# Patient Record
Sex: FEMALE | Race: BLACK OR AFRICAN AMERICAN | NOT HISPANIC OR LATINO
[De-identification: names, ages, dates, MRNs, and addresses within clinical notes are randomized per-mention and may not be internally consistent; named-entity substitution may affect disease eponyms.]

---

## 2022-04-19 ENCOUNTER — APPOINTMENT (OUTPATIENT)
Dept: ANTEPARTUM | Facility: CLINIC | Age: 32
End: 2022-04-19

## 2022-04-19 PROBLEM — Z00.00 ENCOUNTER FOR PREVENTIVE HEALTH EXAMINATION: Status: ACTIVE | Noted: 2022-04-19

## 2022-05-20 ENCOUNTER — ASOB RESULT (OUTPATIENT)
Age: 32
End: 2022-05-20

## 2022-05-20 ENCOUNTER — APPOINTMENT (OUTPATIENT)
Dept: ANTEPARTUM | Facility: CLINIC | Age: 32
End: 2022-05-20
Payer: COMMERCIAL

## 2022-05-20 PROCEDURE — 76813 OB US NUCHAL MEAS 1 GEST: CPT

## 2022-05-20 PROCEDURE — 36416 COLLJ CAPILLARY BLOOD SPEC: CPT

## 2022-05-20 PROCEDURE — 76801 OB US < 14 WKS SINGLE FETUS: CPT

## 2022-05-21 ENCOUNTER — TRANSCRIPTION ENCOUNTER (OUTPATIENT)
Age: 32
End: 2022-05-21

## 2022-07-18 ENCOUNTER — ASOB RESULT (OUTPATIENT)
Age: 32
End: 2022-07-18

## 2022-07-18 ENCOUNTER — APPOINTMENT (OUTPATIENT)
Dept: ANTEPARTUM | Facility: CLINIC | Age: 32
End: 2022-07-18

## 2022-07-18 PROCEDURE — 76811 OB US DETAILED SNGL FETUS: CPT

## 2022-09-01 ENCOUNTER — APPOINTMENT (OUTPATIENT)
Dept: ANTEPARTUM | Facility: CLINIC | Age: 32
End: 2022-09-01

## 2022-09-01 ENCOUNTER — ASOB RESULT (OUTPATIENT)
Age: 32
End: 2022-09-01

## 2022-09-01 PROCEDURE — 76816 OB US FOLLOW-UP PER FETUS: CPT

## 2022-09-01 PROCEDURE — 76819 FETAL BIOPHYS PROFIL W/O NST: CPT

## 2022-09-30 ENCOUNTER — ASOB RESULT (OUTPATIENT)
Age: 32
End: 2022-09-30

## 2022-09-30 ENCOUNTER — APPOINTMENT (OUTPATIENT)
Dept: ANTEPARTUM | Facility: CLINIC | Age: 32
End: 2022-09-30

## 2022-09-30 PROCEDURE — 76816 OB US FOLLOW-UP PER FETUS: CPT | Mod: 59

## 2022-09-30 PROCEDURE — 76819 FETAL BIOPHYS PROFIL W/O NST: CPT

## 2022-10-28 ENCOUNTER — APPOINTMENT (OUTPATIENT)
Dept: ANTEPARTUM | Facility: CLINIC | Age: 32
End: 2022-10-28

## 2022-10-28 ENCOUNTER — ASOB RESULT (OUTPATIENT)
Age: 32
End: 2022-10-28

## 2022-10-28 PROCEDURE — 76818 FETAL BIOPHYS PROFILE W/NST: CPT | Mod: 59

## 2022-10-28 PROCEDURE — 76816 OB US FOLLOW-UP PER FETUS: CPT

## 2022-11-04 ENCOUNTER — APPOINTMENT (OUTPATIENT)
Dept: ANTEPARTUM | Facility: CLINIC | Age: 32
End: 2022-11-04

## 2022-11-04 ENCOUNTER — ASOB RESULT (OUTPATIENT)
Age: 32
End: 2022-11-04

## 2022-11-04 PROCEDURE — 76818 FETAL BIOPHYS PROFILE W/NST: CPT

## 2022-11-11 ENCOUNTER — ASOB RESULT (OUTPATIENT)
Age: 32
End: 2022-11-11

## 2022-11-11 ENCOUNTER — APPOINTMENT (OUTPATIENT)
Dept: ANTEPARTUM | Facility: CLINIC | Age: 32
End: 2022-11-11

## 2022-11-11 PROCEDURE — 76818 FETAL BIOPHYS PROFILE W/NST: CPT

## 2022-11-18 ENCOUNTER — ASOB RESULT (OUTPATIENT)
Age: 32
End: 2022-11-18

## 2022-11-18 ENCOUNTER — APPOINTMENT (OUTPATIENT)
Dept: ANTEPARTUM | Facility: CLINIC | Age: 32
End: 2022-11-18

## 2022-11-18 PROCEDURE — 76816 OB US FOLLOW-UP PER FETUS: CPT

## 2022-11-18 PROCEDURE — 76818 FETAL BIOPHYS PROFILE W/NST: CPT

## 2022-11-30 ENCOUNTER — TRANSCRIPTION ENCOUNTER (OUTPATIENT)
Age: 32
End: 2022-11-30

## 2022-11-30 ENCOUNTER — INPATIENT (INPATIENT)
Facility: HOSPITAL | Age: 32
LOS: 3 days | Discharge: HOME | End: 2022-12-04
Attending: OBSTETRICS & GYNECOLOGY | Admitting: OBSTETRICS & GYNECOLOGY
Payer: COMMERCIAL

## 2022-11-30 VITALS
HEIGHT: 65 IN | SYSTOLIC BLOOD PRESSURE: 115 MMHG | HEART RATE: 91 BPM | RESPIRATION RATE: 18 BRPM | WEIGHT: 293 LBS | DIASTOLIC BLOOD PRESSURE: 60 MMHG | OXYGEN SATURATION: 99 % | TEMPERATURE: 98 F

## 2022-11-30 DIAGNOSIS — Z34.90 ENCOUNTER FOR SUPERVISION OF NORMAL PREGNANCY, UNSPECIFIED, UNSPECIFIED TRIMESTER: ICD-10-CM

## 2022-11-30 DIAGNOSIS — O61.9 FAILED INDUCTION OF LABOR, UNSPECIFIED: ICD-10-CM

## 2022-11-30 LAB
BASOPHILS # BLD AUTO: 0.02 K/UL — SIGNIFICANT CHANGE UP (ref 0–0.2)
BASOPHILS NFR BLD AUTO: 0.3 % — SIGNIFICANT CHANGE UP (ref 0–2)
BLD GP AB SCN SERPL QL: NEGATIVE — SIGNIFICANT CHANGE UP
EOSINOPHIL # BLD AUTO: 0.01 K/UL — SIGNIFICANT CHANGE UP (ref 0–0.5)
EOSINOPHIL NFR BLD AUTO: 0.1 % — SIGNIFICANT CHANGE UP (ref 0–6)
HCT VFR BLD CALC: 38.3 % — SIGNIFICANT CHANGE UP (ref 34.5–45)
HGB BLD-MCNC: 12.5 G/DL — SIGNIFICANT CHANGE UP (ref 11.5–15.5)
IANC: 5.4 K/UL — SIGNIFICANT CHANGE UP (ref 1.8–7.4)
IMM GRANULOCYTES NFR BLD AUTO: 0.5 % — SIGNIFICANT CHANGE UP (ref 0–0.9)
LYMPHOCYTES # BLD AUTO: 1.6 K/UL — SIGNIFICANT CHANGE UP (ref 1–3.3)
LYMPHOCYTES # BLD AUTO: 21.5 % — SIGNIFICANT CHANGE UP (ref 13–44)
MCHC RBC-ENTMCNC: 26.8 PG — LOW (ref 27–34)
MCHC RBC-ENTMCNC: 32.6 GM/DL — SIGNIFICANT CHANGE UP (ref 32–36)
MCV RBC AUTO: 82 FL — SIGNIFICANT CHANGE UP (ref 80–100)
MONOCYTES # BLD AUTO: 0.37 K/UL — SIGNIFICANT CHANGE UP (ref 0–0.9)
MONOCYTES NFR BLD AUTO: 5 % — SIGNIFICANT CHANGE UP (ref 2–14)
NEUTROPHILS # BLD AUTO: 5.4 K/UL — SIGNIFICANT CHANGE UP (ref 1.8–7.4)
NEUTROPHILS NFR BLD AUTO: 72.6 % — SIGNIFICANT CHANGE UP (ref 43–77)
NRBC # BLD: 0 /100 WBCS — SIGNIFICANT CHANGE UP (ref 0–0)
NRBC # FLD: 0 K/UL — SIGNIFICANT CHANGE UP (ref 0–0)
PLATELET # BLD AUTO: 261 K/UL — SIGNIFICANT CHANGE UP (ref 150–400)
RBC # BLD: 4.67 M/UL — SIGNIFICANT CHANGE UP (ref 3.8–5.2)
RBC # FLD: 16.3 % — HIGH (ref 10.3–14.5)
RH IG SCN BLD-IMP: NEGATIVE — SIGNIFICANT CHANGE UP
RH IG SCN BLD-IMP: NEGATIVE — SIGNIFICANT CHANGE UP
SARS-COV-2 RNA SPEC QL NAA+PROBE: SIGNIFICANT CHANGE UP
WBC # BLD: 7.44 K/UL — SIGNIFICANT CHANGE UP (ref 3.8–10.5)
WBC # FLD AUTO: 7.44 K/UL — SIGNIFICANT CHANGE UP (ref 3.8–10.5)

## 2022-11-30 RX ORDER — SODIUM CHLORIDE 9 MG/ML
1000 INJECTION, SOLUTION INTRAVENOUS
Refills: 0 | Status: DISCONTINUED | OUTPATIENT
Start: 2022-11-30 | End: 2022-12-01

## 2022-11-30 RX ORDER — CHLORHEXIDINE GLUCONATE 213 G/1000ML
1 SOLUTION TOPICAL ONCE
Refills: 0 | Status: DISCONTINUED | OUTPATIENT
Start: 2022-11-30 | End: 2022-12-01

## 2022-11-30 RX ORDER — CITRIC ACID/SODIUM CITRATE 300-500 MG
15 SOLUTION, ORAL ORAL EVERY 6 HOURS
Refills: 0 | Status: DISCONTINUED | OUTPATIENT
Start: 2022-11-30 | End: 2022-12-01

## 2022-11-30 RX ORDER — OXYTOCIN 10 UNIT/ML
333.33 VIAL (ML) INJECTION
Qty: 20 | Refills: 0 | Status: DISCONTINUED | OUTPATIENT
Start: 2022-11-30 | End: 2022-12-04

## 2022-11-30 NOTE — OB PROVIDER H&P - NSHPPHYSICALEXAM_GEN_ALL_CORE
Physical Exam:  General: NAD, well appearing  Abdomen: gravid, NT, NR, NG  Extremities: No calf tenderness b/l    SVE: 0.5/50/-3    BSUS: Vertex, +FH    Vitals pending  NST pending

## 2022-11-30 NOTE — OB PROVIDER H&P - PROBLEM SELECTOR PLAN 1
-Admit to OB service  -Admission labs  -IVF  -EFM/[place]  -Monitor vitals  -For PO cytotec  -2U on hold for BMI  -Consent signed an placed in chart  -GBS neg  -Covid swab sent to lab

## 2022-11-30 NOTE — OB PROVIDER H&P - HISTORY OF PRESENT ILLNESS
33yo  @40w4d presenting for IOL for late term. Denies contractions, LOF, vaginal bleeding. Endorses good fetal movement. Reports that this is an otherwise uncomplicated pregnancy.    GBS neg  EFW 3600    OBHx: Primigravida  GYNHx: Denies   MHx: BMI 48.8  SHx: Denies  PsychHx: Denies  SocialHx: Denies  Allergies: Denies  Meds: PNV, ASA, Iron    Patient accepts blood products 33yo  @40w4d presenting for elective IOL. Denies contractions, LOF, vaginal bleeding. Endorses good fetal movement. Reports that this is an otherwise uncomplicated pregnancy.    GBS neg  EFW 3600    OBHx: Primigravida  GYNHx: Denies   MHx: BMI 48.8  SHx: Denies  PsychHx: Denies  SocialHx: Denies  Allergies: Denies  Meds: PNV, ASA, Iron    Patient accepts blood products 33yo  @40w4d presenting for indicated for morbid obesity IOL at term. Denies contractions, LOF, vaginal bleeding. Endorses good fetal movement. Reports that this is an otherwise uncomplicated pregnancy.    GBS neg  EFW 3600    OBHx: Primigravida  GYNHx: Denies   MHx: BMI 48.8  SHx: Denies  PsychHx: Denies  SocialHx: Denies  Allergies: Denies  Meds: PNV, ASA, Iron    Patient accepts blood products

## 2022-11-30 NOTE — OB RN PATIENT PROFILE - PRO PRENATAL LABS ORI SOURCE HIV
interpretation of diagnostic studies/additional history taking/documentation/consultation with other physicians hard copy, drawn during this pregnancy

## 2022-11-30 NOTE — OB RN PATIENT PROFILE - FALL HARM RISK - UNIVERSAL INTERVENTIONS
Bed in lowest position, wheels locked, appropriate side rails in place/Call bell, personal items and telephone in reach/Instruct patient to call for assistance before getting out of bed or chair/Non-slip footwear when patient is out of bed/[place] to call system/Physically safe environment - no spills, clutter or unnecessary equipment/Purposeful Proactive Rounding/Room/bathroom lighting operational, light cord in reach

## 2022-11-30 NOTE — OB PROVIDER H&P - NSLOWPPHRISK_OBGYN_A_OB
No previous uterine incision/Rubin Pregnancy/Less than or equal to 4 previous vaginal births/No known bleeding disorder

## 2022-11-30 NOTE — OB RN PATIENT PROFILE - AS SC BRADEN SENSORY
Prolia injection  given. Verbal order for injection from YUDELKA Flood  . Patient tolerated without incident. See MAR for documentation.      (4) no impairment

## 2022-11-30 NOTE — OB RN PATIENT PROFILE - PRO RUBELLA INFANT
Mohs Histo Method Verbiage: Each section was then chromacoded and processed in the Mohs lab using the Mohs protocol and submitted for frozen section. immune

## 2022-11-30 NOTE — OB PROVIDER H&P - ASSESSMENT
33yo  @40w4d IOL for late term. NST pending. Vitals pending.     Plan:  -Admit to OB service  -Admission labs  -IVF  -EFM/[place]  -Monitor vitals  -For PO cytotec  -2U on hold for BMI  -Consent signed an placed in chart  -GBS neg  -Covid swab sent to lab    Plan per Dr. Mckenzie CURTIS)    Lucila Dash PA-C  31yo  @40w4d elective IOL. NST pending. Vitals pending.     Plan:  -Admit to OB service  -Admission labs  -IVF  -EFM/[place]  -Monitor vitals  -For PO cytotec  -2U on hold for BMI  -Consent signed an placed in chart  -GBS neg  -Covid swab sent to lab    Plan per Dr. Mckenzie CURTIS)    Lucila Dash PA-C

## 2022-12-01 LAB
ALBUMIN SERPL ELPH-MCNC: 2.9 G/DL — LOW (ref 3.3–5)
ALP SERPL-CCNC: 137 U/L — HIGH (ref 40–120)
ALT FLD-CCNC: 34 U/L — HIGH (ref 4–33)
ANION GAP SERPL CALC-SCNC: 13 MMOL/L — SIGNIFICANT CHANGE UP (ref 7–14)
APTT BLD: 25.1 SEC — LOW (ref 27–36.3)
AST SERPL-CCNC: 29 U/L — SIGNIFICANT CHANGE UP (ref 4–32)
BASOPHILS # BLD AUTO: 0.02 K/UL — SIGNIFICANT CHANGE UP (ref 0–0.2)
BASOPHILS NFR BLD AUTO: 0.1 % — SIGNIFICANT CHANGE UP (ref 0–2)
BILIRUB SERPL-MCNC: 0.8 MG/DL — SIGNIFICANT CHANGE UP (ref 0.2–1.2)
BUN SERPL-MCNC: 4 MG/DL — LOW (ref 7–23)
CALCIUM SERPL-MCNC: 8.8 MG/DL — SIGNIFICANT CHANGE UP (ref 8.4–10.5)
CHLORIDE SERPL-SCNC: 103 MMOL/L — SIGNIFICANT CHANGE UP (ref 98–107)
CO2 SERPL-SCNC: 18 MMOL/L — LOW (ref 22–31)
COVID-19 SPIKE DOMAIN AB INTERP: POSITIVE
COVID-19 SPIKE DOMAIN ANTIBODY RESULT: >250 U/ML — HIGH
CREAT SERPL-MCNC: 0.56 MG/DL — SIGNIFICANT CHANGE UP (ref 0.5–1.3)
EGFR: 124 ML/MIN/1.73M2 — SIGNIFICANT CHANGE UP
EOSINOPHIL # BLD AUTO: 0 K/UL — SIGNIFICANT CHANGE UP (ref 0–0.5)
EOSINOPHIL NFR BLD AUTO: 0 % — SIGNIFICANT CHANGE UP (ref 0–6)
GLUCOSE SERPL-MCNC: 140 MG/DL — HIGH (ref 70–99)
HCT VFR BLD CALC: 41.4 % — SIGNIFICANT CHANGE UP (ref 34.5–45)
HGB BLD-MCNC: 13.1 G/DL — SIGNIFICANT CHANGE UP (ref 11.5–15.5)
IANC: 15.54 K/UL — HIGH (ref 1.8–7.4)
IMM GRANULOCYTES NFR BLD AUTO: 0.5 % — SIGNIFICANT CHANGE UP (ref 0–0.9)
INR BLD: 0.94 RATIO — SIGNIFICANT CHANGE UP (ref 0.88–1.16)
LDH SERPL L TO P-CCNC: 239 U/L — HIGH (ref 135–225)
LYMPHOCYTES # BLD AUTO: 0.9 K/UL — LOW (ref 1–3.3)
LYMPHOCYTES # BLD AUTO: 5.3 % — LOW (ref 13–44)
MCHC RBC-ENTMCNC: 26.7 PG — LOW (ref 27–34)
MCHC RBC-ENTMCNC: 31.6 GM/DL — LOW (ref 32–36)
MCV RBC AUTO: 84.3 FL — SIGNIFICANT CHANGE UP (ref 80–100)
MONOCYTES # BLD AUTO: 0.32 K/UL — SIGNIFICANT CHANGE UP (ref 0–0.9)
MONOCYTES NFR BLD AUTO: 1.9 % — LOW (ref 2–14)
NEUTROPHILS # BLD AUTO: 15.54 K/UL — HIGH (ref 1.8–7.4)
NEUTROPHILS NFR BLD AUTO: 92.2 % — HIGH (ref 43–77)
NRBC # BLD: 0 /100 WBCS — SIGNIFICANT CHANGE UP (ref 0–0)
NRBC # FLD: 0 K/UL — SIGNIFICANT CHANGE UP (ref 0–0)
PLATELET # BLD AUTO: 250 K/UL — SIGNIFICANT CHANGE UP (ref 150–400)
POTASSIUM SERPL-MCNC: 3.8 MMOL/L — SIGNIFICANT CHANGE UP (ref 3.5–5.3)
POTASSIUM SERPL-SCNC: 3.8 MMOL/L — SIGNIFICANT CHANGE UP (ref 3.5–5.3)
PROT SERPL-MCNC: 6.1 G/DL — SIGNIFICANT CHANGE UP (ref 6–8.3)
PROTHROM AB SERPL-ACNC: 10.9 SEC — SIGNIFICANT CHANGE UP (ref 10.5–13.4)
RBC # BLD: 4.91 M/UL — SIGNIFICANT CHANGE UP (ref 3.8–5.2)
RBC # FLD: 16.1 % — HIGH (ref 10.3–14.5)
SARS-COV-2 IGG+IGM SERPL QL IA: >250 U/ML — HIGH
SARS-COV-2 IGG+IGM SERPL QL IA: POSITIVE
SODIUM SERPL-SCNC: 134 MMOL/L — LOW (ref 135–145)
T PALLIDUM AB TITR SER: NEGATIVE — SIGNIFICANT CHANGE UP
URATE SERPL-MCNC: 5.3 MG/DL — SIGNIFICANT CHANGE UP (ref 2.5–7)
WBC # BLD: 16.87 K/UL — HIGH (ref 3.8–10.5)
WBC # FLD AUTO: 16.87 K/UL — HIGH (ref 3.8–10.5)

## 2022-12-01 DEVICE — SURGICEL FIBRILLAR 1 X 2": Type: IMPLANTABLE DEVICE | Status: FUNCTIONAL

## 2022-12-01 RX ORDER — NALOXONE HYDROCHLORIDE 4 MG/.1ML
0.1 SPRAY NASAL
Refills: 0 | Status: DISCONTINUED | OUTPATIENT
Start: 2022-12-01 | End: 2022-12-04

## 2022-12-01 RX ORDER — ERTAPENEM SODIUM 1 G/1
INJECTION, POWDER, LYOPHILIZED, FOR SOLUTION INTRAMUSCULAR; INTRAVENOUS
Refills: 0 | Status: DISCONTINUED | OUTPATIENT
Start: 2022-12-01 | End: 2022-12-02

## 2022-12-01 RX ORDER — IBUPROFEN 200 MG
600 TABLET ORAL EVERY 6 HOURS
Refills: 0 | Status: COMPLETED | OUTPATIENT
Start: 2022-12-01 | End: 2023-10-30

## 2022-12-01 RX ORDER — LANOLIN
1 OINTMENT (GRAM) TOPICAL EVERY 6 HOURS
Refills: 0 | Status: DISCONTINUED | OUTPATIENT
Start: 2022-12-01 | End: 2022-12-04

## 2022-12-01 RX ORDER — OXYCODONE HYDROCHLORIDE 5 MG/1
5 TABLET ORAL
Refills: 0 | Status: DISCONTINUED | OUTPATIENT
Start: 2022-12-01 | End: 2022-12-01

## 2022-12-01 RX ORDER — MORPHINE SULFATE 50 MG/1
2 CAPSULE, EXTENDED RELEASE ORAL ONCE
Refills: 0 | Status: DISCONTINUED | OUTPATIENT
Start: 2022-12-01 | End: 2022-12-04

## 2022-12-01 RX ORDER — DEXAMETHASONE 0.5 MG/5ML
4 ELIXIR ORAL EVERY 6 HOURS
Refills: 0 | Status: DISCONTINUED | OUTPATIENT
Start: 2022-12-01 | End: 2022-12-04

## 2022-12-01 RX ORDER — FERROUS SULFATE 325(65) MG
325 TABLET ORAL DAILY
Refills: 0 | Status: DISCONTINUED | OUTPATIENT
Start: 2022-12-01 | End: 2022-12-04

## 2022-12-01 RX ORDER — ERTAPENEM SODIUM 1 G/1
1000 INJECTION, POWDER, LYOPHILIZED, FOR SOLUTION INTRAMUSCULAR; INTRAVENOUS ONCE
Refills: 0 | Status: COMPLETED | OUTPATIENT
Start: 2022-12-01 | End: 2022-12-01

## 2022-12-01 RX ORDER — DIPHENHYDRAMINE HCL 50 MG
25 CAPSULE ORAL EVERY 6 HOURS
Refills: 0 | Status: DISCONTINUED | OUTPATIENT
Start: 2022-12-01 | End: 2022-12-04

## 2022-12-01 RX ORDER — FOLIC ACID 0.8 MG
1 TABLET ORAL DAILY
Refills: 0 | Status: DISCONTINUED | OUTPATIENT
Start: 2022-12-01 | End: 2022-12-04

## 2022-12-01 RX ORDER — HEPARIN SODIUM 5000 [USP'U]/ML
10000 INJECTION INTRAVENOUS; SUBCUTANEOUS EVERY 12 HOURS
Refills: 0 | Status: DISCONTINUED | OUTPATIENT
Start: 2022-12-01 | End: 2022-12-04

## 2022-12-01 RX ORDER — OXYTOCIN 10 UNIT/ML
333.33 VIAL (ML) INJECTION
Qty: 20 | Refills: 0 | Status: DISCONTINUED | OUTPATIENT
Start: 2022-12-01 | End: 2022-12-04

## 2022-12-01 RX ORDER — ACETAMINOPHEN 500 MG
975 TABLET ORAL
Refills: 0 | Status: DISCONTINUED | OUTPATIENT
Start: 2022-12-01 | End: 2022-12-04

## 2022-12-01 RX ORDER — ONDANSETRON 8 MG/1
4 TABLET, FILM COATED ORAL EVERY 6 HOURS
Refills: 0 | Status: DISCONTINUED | OUTPATIENT
Start: 2022-12-01 | End: 2022-12-04

## 2022-12-01 RX ORDER — KETOROLAC TROMETHAMINE 30 MG/ML
30 SYRINGE (ML) INJECTION EVERY 6 HOURS
Refills: 0 | Status: COMPLETED | OUTPATIENT
Start: 2022-12-01 | End: 2022-12-02

## 2022-12-01 RX ORDER — SIMETHICONE 80 MG/1
80 TABLET, CHEWABLE ORAL EVERY 4 HOURS
Refills: 0 | Status: DISCONTINUED | OUTPATIENT
Start: 2022-12-01 | End: 2022-12-04

## 2022-12-01 RX ORDER — SODIUM CHLORIDE 9 MG/ML
1000 INJECTION, SOLUTION INTRAVENOUS
Refills: 0 | Status: DISCONTINUED | OUTPATIENT
Start: 2022-12-01 | End: 2022-12-04

## 2022-12-01 RX ORDER — DIPHENHYDRAMINE HCL 50 MG
25 CAPSULE ORAL EVERY 4 HOURS
Refills: 0 | Status: DISCONTINUED | OUTPATIENT
Start: 2022-12-01 | End: 2022-12-04

## 2022-12-01 RX ORDER — TETANUS TOXOID, REDUCED DIPHTHERIA TOXOID AND ACELLULAR PERTUSSIS VACCINE, ADSORBED 5; 2.5; 8; 8; 2.5 [IU]/.5ML; [IU]/.5ML; UG/.5ML; UG/.5ML; UG/.5ML
0.5 SUSPENSION INTRAMUSCULAR ONCE
Refills: 0 | Status: DISCONTINUED | OUTPATIENT
Start: 2022-12-01 | End: 2022-12-04

## 2022-12-01 RX ORDER — OXYCODONE HYDROCHLORIDE 5 MG/1
10 TABLET ORAL
Refills: 0 | Status: DISCONTINUED | OUTPATIENT
Start: 2022-12-01 | End: 2022-12-01

## 2022-12-01 RX ORDER — OXYCODONE HYDROCHLORIDE 5 MG/1
5 TABLET ORAL
Refills: 0 | Status: DISCONTINUED | OUTPATIENT
Start: 2022-12-01 | End: 2022-12-04

## 2022-12-01 RX ORDER — ERTAPENEM SODIUM 1 G/1
1000 INJECTION, POWDER, LYOPHILIZED, FOR SOLUTION INTRAMUSCULAR; INTRAVENOUS EVERY 24 HOURS
Refills: 0 | Status: DISCONTINUED | OUTPATIENT
Start: 2022-12-02 | End: 2022-12-02

## 2022-12-01 RX ORDER — OXYCODONE HYDROCHLORIDE 5 MG/1
5 TABLET ORAL ONCE
Refills: 0 | Status: DISCONTINUED | OUTPATIENT
Start: 2022-12-01 | End: 2022-12-04

## 2022-12-01 RX ORDER — MAGNESIUM HYDROXIDE 400 MG/1
30 TABLET, CHEWABLE ORAL
Refills: 0 | Status: DISCONTINUED | OUTPATIENT
Start: 2022-12-01 | End: 2022-12-04

## 2022-12-01 RX ADMIN — ERTAPENEM SODIUM 120 MILLIGRAM(S): 1 INJECTION, POWDER, LYOPHILIZED, FOR SOLUTION INTRAMUSCULAR; INTRAVENOUS at 21:10

## 2022-12-01 NOTE — OB PROVIDER DELIVERY SUMMARY - NSPROVIDERDELIVERYNOTE_OBGYN_ALL_OB_FT
Prolonged FHR deceleration noted, on exam patient noted to have SROM clear, cervical exam 6.5/80/-2. Patient repositioned and anesthesiology called. After recovery to baseline of 130 bpm recurrent deep decelerations continued. Decision was made to expedite delivery. Patient was taken to OR. In OR   recurrent decelerations continued. Vaginal prep done and Marrufo catheter was inserted, betadine prep done. Pfannenstiel incision. Low transverse uterine incision made. After the head was elevated out of the pelvis, position noted ROP and attempted to delivery with fundal pressure and manual guidance, but not successful due to maternal body habitus, kiwi vacuum applied to vertex and with the single pull head was delivered atraumatically, vacuum pressure released. Skin incision extended and body delivered successfully. Cord clamped and cut, viable baby boy was handed to awaiting peds. APGARS 5/9. Placenta delivered with uterine massage. Uterus closed with suture, and figure of eight sutures applied to assure hemostasis. Hemostatic powder applied. Bladder noted to be intact and away from the incision. Excellent hemostasis achieved. Muscles reapproximated, fascia closed, sq tissue closed with double layer, after irritation and skin closed with staples. Baby weight 8 lb 4 oz.  MD alejandra Prolonged FHR deceleration noted, on exam patient noted to have SROM clear, cervical exam 6.5/80/-2. Patient repositioned and anesthesiology called. After recovery to baseline of 130 bpm recurrent deep decelerations continued. Decision was made to expedite delivery. Patient was taken to OR. In OR   recurrent decelerations continued. Vaginal prep done and Marrufo catheter was inserted, betadine prep done. Pfannenstiel incision. Low transverse uterine incision made. After the head was elevated out of the pelvis, position noted ROP and attempted to delivery with fundal pressure and manual guidance, but not successful due to maternal body habitus, kiwi vacuum applied to vertex and with the single pull head was delivered atraumatically, vacuum pressure released. Skin incision extended and body delivered successfully. Cord clamped and cut, viable baby boy was handed to awaiting peds. APGARS 5/9. Placenta delivered with uterine massage. Uterus closed with suture, and figure of eight sutures applied to assure hemostasis. Hemostatic powder applied. Bladder noted to be intact and away from the incision. Excellent hemostasis achieved. Muscles reapproximated, fascia closed, sq tissue closed with double layer, after irritation and skin closed with staples. Baby weight 8 lb 4 oz.  MD alejandra    Dictation #: [id number]

## 2022-12-01 NOTE — OB RN INTRAOPERATIVE NOTE - NSSELHIDDEN_OBGYN_ALL_OB_FT
[NS_DeliveryAttending1_OBGYN_ALL_OB_FT:[id number]==],[NS_DeliveryRN_OBGYN_ALL_OB_FT:[id number]] [NS_DeliveryAttending1_OBGYN_ALL_OB_FT:[id number]==],[NS_DeliveryRN_OBGYN_ALL_OB_FT:[id number]],[NS_CirculateRN2_OBGYN_ALL_OB_FT:[id number]]

## 2022-12-01 NOTE — OB RN DELIVERY SUMMARY - NS_SEPSISRSKCALC_OBGYN_ALL_OB_FT
EOS calculated successfully. EOS Risk Factor: 0.5/1000 live births ([facility] national incidence); GA=40w5d; Temp=98.1; ROM=0.533; GBS='Negative'; Antibiotics='No antibiotics or any antibiotics < 2 hrs prior to birth'

## 2022-12-01 NOTE — OB PROVIDER LABOR PROGRESS NOTE - NS_SUBJECTIVE/OBJECTIVE_OBGYN_ALL_OB_FT
Patient in 8/10 pain with decelerations noted on FHT. Denies LOF/VB.+FM
pt seen and examined at bedside for continuation of care
Patient is s/p epidural. Feeling more comfortable.

## 2022-12-01 NOTE — OB PROVIDER LABOR PROGRESS NOTE - NS_OBIHIFHRDETAILS_OBGYN_ALL_OB_FT
130/mod variability/+accels/variable decel x1
Prolonged deceleration followed by recurrent deep decelerations.
140 moderate variability + acels variable and late decelerations noted

## 2022-12-01 NOTE — OB NEONATOLOGY/PEDIATRICIAN DELIVERY SUMMARY - NSPEDSNEONOTESA_OBGYN_ALL_OB_FT
Baby boy born at 40w5d via unscheduled CS in the setting of fetal bradycardia following risk reducing IOL to 31y/o  mother. No significant maternal history, and prenatal course uncomplicated.  Maternal blood type B-. Prenatal labs: Hepatitis B negative, HIV negative, RPR negative, Rubella immune. GBS negative . SROM at 1800 with clear fluid. Kiwi vacuum used during delivery. Baby emerged with decreased tone; was brought to radiant warmer and dried, stimulated and suctioned. CPAP given for approximately 3 minutes (max 5/40%) with good response. APGARS of 5 and 9.     Mom would like to breastfeed, consents to Hepatitis B vaccine and requests circumcision. EOS 0.04.

## 2022-12-01 NOTE — OB PROVIDER LABOR PROGRESS NOTE - ASSESSMENT
Late decelerations noted with some contractions, moderate variability. Epidural was placed. Patient felt more comfortable. Prolonged deceleration for over 6 min to 60s and 70s, variability remained moderate. Patient was examined and repositioned with O2 in place. Anaesthesia called. After recovery to baseline of 135, recurrent deep decelerations continued. Decision was made to proceed with  section to expedite delivery due to abnormal fetal status and increased risk of intrauterine fetal injury if undelivered. Plan discussed with patient, ob team called.    MD alejandra
31 yo  at 40/5 weeks, rrIOL  - pt using Destiny for FHT, some periods of discontinuity noted  - cont with PO cytotec   - cont to monitor     d/w Dr Norma huitron PA-C
P0 at 40+ weeks IOL  - continue efm/toco  -continue resuscitative measures, continue O2, LR bolus  - Transfer to L&D for epidural placement    d/w Dr Green who in enroute to [facility]

## 2022-12-01 NOTE — OB PROVIDER DELIVERY SUMMARY - NSVACDELDETAILSCSA _OBGYN_ALL_OB_FT
after the head was elevated out of the pelvis, position noted ROP and attempted to delivery with fundal pressure, but not successful due to maternal body habitus, kiwi vacuum applied to vertex and with the single pull head was delivered atraumatically, vacuum pressure released

## 2022-12-01 NOTE — OB PROVIDER DELIVERY SUMMARY - NSSELHIDDEN_OBGYN_ALL_OB_FT
[NS_DeliveryAttending1_OBGYN_ALL_OB_FT:[id number]==],[NS_DeliveryRN_OBGYN_ALL_OB_FT:[id number]=]

## 2022-12-01 NOTE — OB RN DELIVERY SUMMARY - NSSELHIDDEN_OBGYN_ALL_OB_FT
[NS_DeliveryAttending1_OBGYN_ALL_OB_FT:[id number]==],[NS_DeliveryRN_OBGYN_ALL_OB_FT:[id number]]

## 2022-12-02 ENCOUNTER — APPOINTMENT (OUTPATIENT)
Dept: ANTEPARTUM | Facility: CLINIC | Age: 32
End: 2022-12-02

## 2022-12-02 LAB
ALBUMIN SERPL ELPH-MCNC: 2.5 G/DL — LOW (ref 3.3–5)
ALP SERPL-CCNC: 112 U/L — SIGNIFICANT CHANGE UP (ref 40–120)
ALT FLD-CCNC: 30 U/L — SIGNIFICANT CHANGE UP (ref 4–33)
ANION GAP SERPL CALC-SCNC: 8 MMOL/L — SIGNIFICANT CHANGE UP (ref 7–14)
APTT BLD: 27.1 SEC — SIGNIFICANT CHANGE UP (ref 27–36.3)
AST SERPL-CCNC: 29 U/L — SIGNIFICANT CHANGE UP (ref 4–32)
BASOPHILS # BLD AUTO: 0.03 K/UL — SIGNIFICANT CHANGE UP (ref 0–0.2)
BASOPHILS NFR BLD AUTO: 0.2 % — SIGNIFICANT CHANGE UP (ref 0–2)
BILIRUB SERPL-MCNC: 0.9 MG/DL — SIGNIFICANT CHANGE UP (ref 0.2–1.2)
BUN SERPL-MCNC: 6 MG/DL — LOW (ref 7–23)
CALCIUM SERPL-MCNC: 8.4 MG/DL — SIGNIFICANT CHANGE UP (ref 8.4–10.5)
CHLORIDE SERPL-SCNC: 102 MMOL/L — SIGNIFICANT CHANGE UP (ref 98–107)
CO2 SERPL-SCNC: 21 MMOL/L — LOW (ref 22–31)
CREAT SERPL-MCNC: 0.57 MG/DL — SIGNIFICANT CHANGE UP (ref 0.5–1.3)
EGFR: 123 ML/MIN/1.73M2 — SIGNIFICANT CHANGE UP
EOSINOPHIL # BLD AUTO: 0 K/UL — SIGNIFICANT CHANGE UP (ref 0–0.5)
EOSINOPHIL NFR BLD AUTO: 0 % — SIGNIFICANT CHANGE UP (ref 0–6)
GLUCOSE SERPL-MCNC: 116 MG/DL — HIGH (ref 70–99)
HCT VFR BLD CALC: 35.4 % — SIGNIFICANT CHANGE UP (ref 34.5–45)
HGB BLD-MCNC: 11.3 G/DL — LOW (ref 11.5–15.5)
IANC: 11.02 K/UL — HIGH (ref 1.8–7.4)
IMM GRANULOCYTES NFR BLD AUTO: 0.7 % — SIGNIFICANT CHANGE UP (ref 0–0.9)
INR BLD: 0.98 RATIO — SIGNIFICANT CHANGE UP (ref 0.88–1.16)
KLEIHAUER-BETKE CALCULATION: 0 % — SIGNIFICANT CHANGE UP
LDH SERPL L TO P-CCNC: 287 U/L — HIGH (ref 135–225)
LYMPHOCYTES # BLD AUTO: 1.66 K/UL — SIGNIFICANT CHANGE UP (ref 1–3.3)
LYMPHOCYTES # BLD AUTO: 12.1 % — LOW (ref 13–44)
MCHC RBC-ENTMCNC: 26.5 PG — LOW (ref 27–34)
MCHC RBC-ENTMCNC: 31.9 GM/DL — LOW (ref 32–36)
MCV RBC AUTO: 82.9 FL — SIGNIFICANT CHANGE UP (ref 80–100)
MONOCYTES # BLD AUTO: 0.89 K/UL — SIGNIFICANT CHANGE UP (ref 0–0.9)
MONOCYTES NFR BLD AUTO: 6.5 % — SIGNIFICANT CHANGE UP (ref 2–14)
NEUTROPHILS # BLD AUTO: 11.02 K/UL — HIGH (ref 1.8–7.4)
NEUTROPHILS NFR BLD AUTO: 80.5 % — HIGH (ref 43–77)
NRBC # BLD: 0 /100 WBCS — SIGNIFICANT CHANGE UP (ref 0–0)
NRBC # FLD: 0 K/UL — SIGNIFICANT CHANGE UP (ref 0–0)
PLATELET # BLD AUTO: 241 K/UL — SIGNIFICANT CHANGE UP (ref 150–400)
POTASSIUM SERPL-MCNC: 3.6 MMOL/L — SIGNIFICANT CHANGE UP (ref 3.5–5.3)
POTASSIUM SERPL-SCNC: 3.6 MMOL/L — SIGNIFICANT CHANGE UP (ref 3.5–5.3)
PROT SERPL-MCNC: 5.2 G/DL — LOW (ref 6–8.3)
PROTHROM AB SERPL-ACNC: 11.4 SEC — SIGNIFICANT CHANGE UP (ref 10.5–13.4)
RBC # BLD: 4.27 M/UL — SIGNIFICANT CHANGE UP (ref 3.8–5.2)
RBC # FLD: 16.6 % — HIGH (ref 10.3–14.5)
SODIUM SERPL-SCNC: 131 MMOL/L — LOW (ref 135–145)
URATE SERPL-MCNC: 5.1 MG/DL — SIGNIFICANT CHANGE UP (ref 2.5–7)
WBC # BLD: 13.69 K/UL — HIGH (ref 3.8–10.5)
WBC # FLD AUTO: 13.69 K/UL — HIGH (ref 3.8–10.5)

## 2022-12-02 PROCEDURE — 99222 1ST HOSP IP/OBS MODERATE 55: CPT

## 2022-12-02 RX ORDER — IBUPROFEN 200 MG
600 TABLET ORAL EVERY 6 HOURS
Refills: 0 | Status: DISCONTINUED | OUTPATIENT
Start: 2022-12-02 | End: 2022-12-04

## 2022-12-02 RX ADMIN — Medication 30 MILLIGRAM(S): at 06:39

## 2022-12-02 RX ADMIN — Medication 975 MILLIGRAM(S): at 18:18

## 2022-12-02 RX ADMIN — Medication 975 MILLIGRAM(S): at 12:00

## 2022-12-02 RX ADMIN — Medication 1 TABLET(S): at 11:15

## 2022-12-02 RX ADMIN — Medication 325 MILLIGRAM(S): at 11:30

## 2022-12-02 RX ADMIN — Medication 975 MILLIGRAM(S): at 18:51

## 2022-12-02 RX ADMIN — Medication 1 MILLIGRAM(S): at 11:30

## 2022-12-02 RX ADMIN — HEPARIN SODIUM 10000 UNIT(S): 5000 INJECTION INTRAVENOUS; SUBCUTANEOUS at 06:25

## 2022-12-02 RX ADMIN — Medication 30 MILLIGRAM(S): at 06:25

## 2022-12-02 RX ADMIN — Medication 30 MILLIGRAM(S): at 14:00

## 2022-12-02 RX ADMIN — Medication 975 MILLIGRAM(S): at 11:14

## 2022-12-02 RX ADMIN — Medication 30 MILLIGRAM(S): at 13:05

## 2022-12-02 RX ADMIN — HEPARIN SODIUM 10000 UNIT(S): 5000 INJECTION INTRAVENOUS; SUBCUTANEOUS at 18:18

## 2022-12-02 NOTE — CONSULT NOTE ADULT - ATTENDING COMMENTS
32 f s/p emergency  in OR , had no fever, chills or any symptoms prior but there was concern for contamination as it was an emergency procedure so pt was started on ertapenem  pt afebrile, WBC 16 post op and now 13  no evidence of infection and surgery was done in OR, pt afebrile and doing well    * discontinue ertapenem  * monitor WBC/diff and temp cuve    The above assessment and plan was discussed with post partum    Mayela Gomez MD  contact on teams  After 5pm and on weekends call [phone]

## 2022-12-02 NOTE — PROGRESS NOTE ADULT - ATTENDING COMMENTS
patient seen and evaluate  doing well  agreed with resident note  has been ambulating and passing gas  incision c/d/i  garcia out  31 y/o ,s/p stat csection for nrfhr  ghtn-blood pressure wnl  encourage ambulation and breastfeeding  wound care  dc antibiotics 24 hrs post delivery  anticipate dc home pod#3

## 2022-12-02 NOTE — CONSULT NOTE ADULT - SUBJECTIVE AND OBJECTIVE BOX
Patient is a 33 yo Female who presents with a chief complaint of s/p      HPI:  33 yo F POD#1 s/p emergent LTCS.       REVIEW OF SYSTEMS      prior hospital charts reviewed [V]  primary team notes reviewed [V]  other consultant notes reviewed [V]    PAST MEDICAL & SURGICAL HISTORY:  Obese    No significant past surgical history    SOCIAL HISTORY:  - Denied smoking/vaping/alcohol/recreational drug use    FAMILY HISTORY:    Allergies  No Known Allergies    ANTIMICROBIALS:  ertapenem  IVPB    ertapenem  IVPB 1000 every 24 hours    ANTIMICROBIALS (past 90 days):  MEDICATIONS  (STANDING):    ertapenem  IVPB   120 mL/Hr IV Intermittent (22 @ 21:10)    OTHER MEDS:   MEDICATIONS  (STANDING):  acetaminophen     Tablet .. 975 <User Schedule>  dexAMETHasone  Injectable 4 every 6 hours PRN  diphenhydrAMINE 25 every 4 hours PRN  diphenhydrAMINE 25 every 6 hours PRN  diphtheria/tetanus/pertussis (acellular) Vaccine (Adacel) 0.5 once  heparin   Injectable 29898 every 12 hours  ibuprofen  Tablet. 600 every 6 hours  ketorolac   Injectable 30 every 6 hours  magnesium hydroxide Suspension 30 two times a day PRN  morphine PF Epidural 2 once  ondansetron Injectable 4 every 6 hours PRN  oxyCODONE    IR 5 every 3 hours PRN  oxyCODONE    IR 10 every 3 hours PRN  oxyCODONE    IR 5 every 3 hours PRN  oxyCODONE    IR 5 once PRN  oxytocin Infusion 333.333 <Continuous>  oxytocin Infusion 333.333 <Continuous>  simethicone 80 every 4 hours PRN    VITALS:  Vital Signs Last 24 Hrs  T(F): 98.2 (22 @ 10:00), Max: 98.6 (22 @ 01:10)    Vital Signs Last 24 Hrs  HR: 98 (22 @ 10:00) (78 - 110)  BP: 112/60 (22 @ 10:00) (100/65 - 183/91)  RR: 18 (22 @ 10:00)  SpO2: 100% (22 @ 10:00) (83% - 100%)  Wt(kg): --    EXAM:      Labs:                        11.3   13.69 )-----------( 241      ( 02 Dec 2022 06:15 )             35.4     12-02    131<L>  |  102  |  6<L>  ----------------------------<  116<H>  3.6   |  21<L>  |  0.57    Ca    8.4      02 Dec 2022 06:15    TPro  5.2<L>  /  Alb  2.5<L>  /  TBili  0.9  /  DBili  x   /  AST  29  /  ALT  30  /  AlkPhos  112      WBC Trend:  WBC Count: 13.69 (22 @ 06:15)  WBC Count: 16.87 (22 @ 20:45)  WBC Count: 7.44 (22 @ 18:20)    Auto Neutrophil #: 11.02 K/uL (22 @ 06:15)  Auto Neutrophil #: 15.54 K/uL (22 @ 20:45)  Auto Neutrophil #: 5.40 K/uL (22 @ 18:20)    Creatine Trend:  Creatinine, Serum: 0.57 ()  Creatinine, Serum: 0.56 ()    Liver Biochemical Testing Trend:  Alanine Aminotransferase (ALT/SGPT): 30 ()  Alanine Aminotransferase (ALT/SGPT): 34 *H* ()  Aspartate Aminotransferase (AST/SGOT): 29 (22 @ 06:15)  Aspartate Aminotransferase (AST/SGOT): 29 (22 @ 20:45)  Bilirubin Total, Serum: 0.9 ()  Bilirubin Total, Serum: 0.8 ()    Trend LDH  22 @ 06:15  287<H>  22 @ 20:45  239<H>    Auto Eosinophil %: 0.0 % (22 @ 06:15)  Auto Eosinophil %: 0.0 % (22 @ 20:45)  Auto Eosinophil %: 0.1 % (22 @ 18:20)    MICROBIOLOGY:    Treponema Pallidum Antibody Interpretation: Negative (22 @ 18:20)    COVID-19 PCR: NotDetec (22 @ 19:01)    COVID-19 Brandon Domain AB Interp: Positive (22 @ 18:20)    Lactate Dehydrogenase, Serum: 287 ()  Lactate Dehydrogenase, Serum: 239 ()    RADIOLOGY:  imaging below personally reviewed     Patient is a 33 yo Female who presents with a chief complaint of s/p      HPI:  33 yo F POD#1 s/p emergent LTCS.     ID consulted as there was concern for infection as pt's pannus had to be held up during surgery. No fevers or chills. Leukocytosis downtrending. Pt with no acute abd pain. Surgical site appears intact. Currently on Ertapenem.     REVIEW OF SYSTEMS  Constitutional: No fevers, chills  Skin: No rash  Eyes: No discharge	  ENMT: No sore throat  Respiratory: No cough, no SOB  Cardiovascular:  No chest pain  Gastrointestinal: No pain, nausea, vomiting  Genitourinary: No dysuria  Neurological: No HA, no AMS     prior hospital charts reviewed [V]  primary team notes reviewed [V]  other consultant notes reviewed [V]    PAST MEDICAL & SURGICAL HISTORY:  Obese    No significant past surgical history    SOCIAL HISTORY:  - Denied smoking/vaping/alcohol/recreational drug use    FAMILY HISTORY:    Allergies  No Known Allergies    ANTIMICROBIALS:  ertapenem  IVPB    ertapenem  IVPB 1000 every 24 hours    ANTIMICROBIALS (past 90 days):  MEDICATIONS  (STANDING):    ertapenem  IVPB   120 mL/Hr IV Intermittent (22 @ 21:10)    OTHER MEDS:   MEDICATIONS  (STANDING):  acetaminophen     Tablet .. 975 <User Schedule>  dexAMETHasone  Injectable 4 every 6 hours PRN  diphenhydrAMINE 25 every 4 hours PRN  diphenhydrAMINE 25 every 6 hours PRN  diphtheria/tetanus/pertussis (acellular) Vaccine (Adacel) 0.5 once  heparin   Injectable 64978 every 12 hours  ibuprofen  Tablet. 600 every 6 hours  ketorolac   Injectable 30 every 6 hours  magnesium hydroxide Suspension 30 two times a day PRN  morphine PF Epidural 2 once  ondansetron Injectable 4 every 6 hours PRN  oxyCODONE    IR 5 every 3 hours PRN  oxyCODONE    IR 10 every 3 hours PRN  oxyCODONE    IR 5 every 3 hours PRN  oxyCODONE    IR 5 once PRN  oxytocin Infusion 333.333 <Continuous>  oxytocin Infusion 333.333 <Continuous>  simethicone 80 every 4 hours PRN    VITALS:  Vital Signs Last 24 Hrs  T(F): 98.2 (22 @ 10:00), Max: 98.6 (22 @ 01:10)    Vital Signs Last 24 Hrs  HR: 98 (22 @ 10:00) (78 - 110)  BP: 112/60 (22 @ 10:00) (100/65 - 183/91)  RR: 18 (22 @ 10:00)  SpO2: 100% (22 @ 10:00) (83% - 100%)  Wt(kg): --    EXAM:  General: Patient in NAD  HEENT: NCAT, EOMI  CV: S1+S2, no m/r/g appreciated   Lungs: No respiratory distress, CTAB  Abd: Soft, nontender, no guarding   site c/d/i  : No suprapubic tenderness  Neuro: Alert and oriented to time, place and person  Ext: No cyanosis  Skin: No rash, no phlebitis     Labs:                        11.3   13.69 )-----------( 241      ( 02 Dec 2022 06:15 )             35.4         131<L>  |  102  |  6<L>  ----------------------------<  116<H>  3.6   |  21<L>  |  0.57    Ca    8.4      02 Dec 2022 06:15    TPro  5.2<L>  /  Alb  2.5<L>  /  TBili  0.9  /  DBili  x   /  AST  29  /  ALT  30  /  AlkPhos  112      WBC Trend:  WBC Count: 13.69 (22 @ 06:15)  WBC Count: 16.87 (22 @ 20:45)  WBC Count: 7.44 (22 @ 18:20)    Auto Neutrophil #: 11.02 K/uL (22 @ 06:15)  Auto Neutrophil #: 15.54 K/uL (22 @ 20:45)  Auto Neutrophil #: 5.40 K/uL (22 @ 18:20)    Creatine Trend:  Creatinine, Serum: 0.57 ()  Creatinine, Serum: 0.56 ()    Liver Biochemical Testing Trend:  Alanine Aminotransferase (ALT/SGPT): 30 ()  Alanine Aminotransferase (ALT/SGPT): 34 *H* ()  Aspartate Aminotransferase (AST/SGOT): 29 (22 @ 06:15)  Aspartate Aminotransferase (AST/SGOT): 29 (22 @ 20:45)  Bilirubin Total, Serum: 0.9 ()  Bilirubin Total, Serum: 0.8 ()    Trend LDH  22 @ 06:15  287<H>  22 @ 20:45  239<H>    Auto Eosinophil %: 0.0 % (22 @ 06:15)  Auto Eosinophil %: 0.0 % (22 @ 20:45)  Auto Eosinophil %: 0.1 % (22 @ 18:20)    MICROBIOLOGY:    Treponema Pallidum Antibody Interpretation: Negative (22 @ 18:20)    COVID-19 PCR: NotDetec (22 @ 19:01)    COVID-19 [place] Domain AB Interp: Positive (22 @ 18:20)    Lactate Dehydrogenase, Serum: 287 ()  Lactate Dehydrogenase, Serum: 239 ()    RADIOLOGY:  imaging below personally reviewed Patient is a 33 yo Female who presents with a chief complaint of s/p      HPI:  33 yo F POD#1 s/p emergent LTCS.     ID consulted as there was concern for infection as pt's pannus had to be held up during surgery. No fevers or chills. Leukocytosis downtrending. Pt with no acute abd pain. Surgical site appears intact. Currently on Ertapenem.     REVIEW OF SYSTEMS  Constitutional: No fevers, chills  Skin: No rash  Eyes: No discharge	  ENMT: No sore throat  Respiratory: No cough, no SOB  Cardiovascular:  No chest pain  Gastrointestinal: No pain, nausea, vomiting  Genitourinary: No dysuria  MSK: no joint pain  Neurological: No HA, no AMS   psych: no anxiety  prior hospital charts reviewed [V]  primary team notes reviewed [V]  other consultant notes reviewed [V]    PAST MEDICAL & SURGICAL HISTORY:  Obese    No significant past surgical history    SOCIAL HISTORY:  from [facility], lives with her mother, no smoking, alcohol or drug abuse  no recent travel    FAMILY HISTORY:    Allergies  No Known Allergies    ANTIMICROBIALS:  ertapenem  IVPB    ertapenem  IVPB 1000 every 24 hours    ANTIMICROBIALS (past 90 days):  MEDICATIONS  (STANDING):    ertapenem  IVPB   120 mL/Hr IV Intermittent (22 @ 21:10)    OTHER MEDS:   MEDICATIONS  (STANDING):  acetaminophen     Tablet .. 975 <User Schedule>  dexAMETHasone  Injectable 4 every 6 hours PRN  diphenhydrAMINE 25 every 4 hours PRN  diphenhydrAMINE 25 every 6 hours PRN  diphtheria/tetanus/pertussis (acellular) Vaccine (Adacel) 0.5 once  heparin   Injectable 49192 every 12 hours  ibuprofen  Tablet. 600 every 6 hours  ketorolac   Injectable 30 every 6 hours  magnesium hydroxide Suspension 30 two times a day PRN  morphine PF Epidural 2 once  ondansetron Injectable 4 every 6 hours PRN  oxyCODONE    IR 5 every 3 hours PRN  oxyCODONE    IR 10 every 3 hours PRN  oxyCODONE    IR 5 every 3 hours PRN  oxyCODONE    IR 5 once PRN  oxytocin Infusion 333.333 <Continuous>  oxytocin Infusion 333.333 <Continuous>  simethicone 80 every 4 hours PRN    VITALS:  Vital Signs Last 24 Hrs  T(F): 98.2 (22 @ 10:00), Max: 98.6 (22 @ 01:10)    Vital Signs Last 24 Hrs  HR: 98 (22 @ 10:00) (78 - 110)  BP: 112/60 (22 @ 10:00) (100/65 - 183/91)  RR: 18 (22 @ 10:00)  SpO2: 100% (22 @ 10:00) (83% - 100%)  Wt(kg): --    EXAM:  General: NAD, non toxic  Head: atraumatic, normocephalic  Eyes: no periorbital edema  ENT: No oral lesion  CV: S1+S2, no m/r/g appreciated   Lungs: No respiratory distress, CTAB  Abd: Soft, nontender, no tenderness  :  incision with dressing, no tenderness  Neuro: Alert and oriented to time, place and person  MSK: no joint swelling  Skin: No rash  vascular:  no phlebitis   psych: normal affect  Labs:                        11.3   13.69 )-----------( 241      ( 02 Dec 2022 06:15 )             35.4         131<L>  |  102  |  6<L>  ----------------------------<  116<H>  3.6   |  21<L>  |  0.57    Ca    8.4      02 Dec 2022 06:15    TPro  5.2<L>  /  Alb  2.5<L>  /  TBili  0.9  /  DBili  x   /  AST  29  /  ALT  30  /  AlkPhos  112      WBC Trend:  WBC Count: 13.69 (22 @ 06:15)  WBC Count: 16.87 (22 @ 20:45)  WBC Count: 7.44 (22 @ 18:20)    Auto Neutrophil #: 11.02 K/uL (22 @ 06:15)  Auto Neutrophil #: 15.54 K/uL (22 @ 20:45)  Auto Neutrophil #: 5.40 K/uL (22 @ 18:20)    Creatine Trend:  Creatinine, Serum: 0.57 ()  Creatinine, Serum: 0.56 ()    Liver Biochemical Testing Trend:  Alanine Aminotransferase (ALT/SGPT): 30 ()  Alanine Aminotransferase (ALT/SGPT): 34 *H* ()  Aspartate Aminotransferase (AST/SGOT): 29 (22 @ 06:15)  Aspartate Aminotransferase (AST/SGOT): 29 (22 @ 20:45)  Bilirubin Total, Serum: 0.9 ()  Bilirubin Total, Serum: 0.8 ()    Trend LDH  22 @ :15  287<H>  22 @ 20:45  239<H>    Auto Eosinophil %: 0.0 % (22 @ 06:15)  Auto Eosinophil %: 0.0 % (22 @ 20:45)  Auto Eosinophil %: 0.1 % (22 @ 18:20)    MICROBIOLOGY:    Treponema Pallidum Antibody Interpretation: Negative (22 @ 18:20)    COVID-19 PCR: NotDetec (22 @ 19:01)    COVID-19 Brandon Domain AB Interp: Positive (22 @ 18:20)    Lactate Dehydrogenase, Serum: 287 ()  Lactate Dehydrogenase, Serum: 239 ()

## 2022-12-02 NOTE — PROGRESS NOTE ADULT - ASSESSMENT
A/P: 32y POD#1 s/p emergent pLTCS for cat2 tracing c/b gHTN. Patient is progressing appropriately post-operatively   - Continue regular diet.  - Encourage ambulation  - Continue motrin, tylenol, oxycodone PRN for pain control.  - F/u AM CBC (13.1/41.4->11.3/35.4)    #gHTN  - BPs 110-130s/60-70s  - Asx     #Emergent pLTCS  - Will continue Ertapenem while inpatient for infection ppx. Pt afebrile     Satinedr Moses, PGY-1  Ob/Gyn

## 2022-12-02 NOTE — CONSULT NOTE ADULT - ASSESSMENT
Incomplete Note    Pt to be seen  31 yo F POD#1 s/p emergent LTCS.     No fevers or chills  Leukocytosis likely reactive i/s/o recent surgery, downtrending   Pt with no acute abd pain  Surgical site appears c/d/i  Currently on Ertapenem     OVERALL  POD#1 s/p emergent LTCS  No fevers  Leukocytosis -> improving     RECOMMENDATIONS  -Stop Ertapenem and monitor off of antibiotics   -Trend leukocytosis to resolution    All recommendations are tentative pending Attending Attestation.    Laney Quach MD, PGY-4   ID Fellow  [employer] Teams Preferred  After 5pm/weekends call [phone]  33 yo F POD#1 s/p emergent LTCS.     No fevers or chills  Leukocytosis likely reactive i/s/o recent surgery, downtrending   Pt with no acute abd pain  Currently on Ertapenem     OVERALL  POD#1 s/p emergent LTCS  No fevers  Leukocytosis -> improving   Surgical site appears intact to my exam     RECOMMENDATIONS  -Stop Ertapenem and monitor off of antibiotics   -Trend leukocytosis to resolution  -Bladder scans as per primary team     All recommendations are tentative pending Attending Attestation.    Laney Quach MD, PGY-4   ID Fellow  Long Teams Preferred  After 5pm/weekends call [phone]  33 yo F POD#1 s/p emergent LTCS.     No fevers or chills  Leukocytosis likely reactive i/s/o recent surgery, downtrending   Pt with no acute abd pain  Currently on Ertapenem     OVERALL  POD#1 s/p emergent LTCS  No fevers  Leukocytosis -> improving   Surgical site appears intact to my exam     RECOMMENDATIONS  -Stop Ertapenem and monitor off of antibiotics   -Trend leukocytosis to resolution  -Bladder scans as per primary team         Laney Quach MD, PGY-4   ID Fellow  Long Teams Preferred  After 5pm/weekends call [phone]

## 2022-12-03 ENCOUNTER — TRANSCRIPTION ENCOUNTER (OUTPATIENT)
Age: 32
End: 2022-12-03

## 2022-12-03 RX ORDER — ACETAMINOPHEN 500 MG
3 TABLET ORAL
Qty: 0 | Refills: 0 | DISCHARGE
Start: 2022-12-03

## 2022-12-03 RX ORDER — FERROUS SULFATE 325(65) MG
1 TABLET ORAL
Qty: 0 | Refills: 0 | DISCHARGE

## 2022-12-03 RX ORDER — ASPIRIN/CALCIUM CARB/MAGNESIUM 324 MG
1 TABLET ORAL
Qty: 0 | Refills: 0 | DISCHARGE

## 2022-12-03 RX ORDER — IBUPROFEN 200 MG
1 TABLET ORAL
Qty: 0 | Refills: 0 | DISCHARGE
Start: 2022-12-03

## 2022-12-03 RX ADMIN — Medication 975 MILLIGRAM(S): at 09:07

## 2022-12-03 RX ADMIN — Medication 600 MILLIGRAM(S): at 05:23

## 2022-12-03 RX ADMIN — Medication 975 MILLIGRAM(S): at 16:30

## 2022-12-03 RX ADMIN — Medication 600 MILLIGRAM(S): at 12:40

## 2022-12-03 RX ADMIN — HEPARIN SODIUM 10000 UNIT(S): 5000 INJECTION INTRAVENOUS; SUBCUTANEOUS at 17:48

## 2022-12-03 RX ADMIN — Medication 975 MILLIGRAM(S): at 10:07

## 2022-12-03 RX ADMIN — Medication 600 MILLIGRAM(S): at 11:43

## 2022-12-03 RX ADMIN — Medication 1 TABLET(S): at 11:43

## 2022-12-03 RX ADMIN — Medication 600 MILLIGRAM(S): at 06:02

## 2022-12-03 RX ADMIN — Medication 975 MILLIGRAM(S): at 03:57

## 2022-12-03 RX ADMIN — Medication 975 MILLIGRAM(S): at 15:31

## 2022-12-03 RX ADMIN — Medication 600 MILLIGRAM(S): at 17:48

## 2022-12-03 RX ADMIN — Medication 1 MILLIGRAM(S): at 11:43

## 2022-12-03 RX ADMIN — Medication 325 MILLIGRAM(S): at 11:43

## 2022-12-03 RX ADMIN — HEPARIN SODIUM 10000 UNIT(S): 5000 INJECTION INTRAVENOUS; SUBCUTANEOUS at 05:23

## 2022-12-03 RX ADMIN — Medication 600 MILLIGRAM(S): at 00:02

## 2022-12-03 RX ADMIN — Medication 975 MILLIGRAM(S): at 20:07

## 2022-12-03 RX ADMIN — Medication 600 MILLIGRAM(S): at 18:40

## 2022-12-03 RX ADMIN — Medication 600 MILLIGRAM(S): at 01:00

## 2022-12-03 RX ADMIN — Medication 975 MILLIGRAM(S): at 03:20

## 2022-12-03 RX ADMIN — Medication 975 MILLIGRAM(S): at 20:35

## 2022-12-03 NOTE — DISCHARGE NOTE OB - CARE PROVIDER_API CALL
Anette Green)  Obstetrics and Gynecology  [address] [place] [address]  Phone: [phone]  Fax: [phone]  Follow Up Time:

## 2022-12-03 NOTE — DISCHARGE NOTE OB - MEDICATION SUMMARY - MEDICATIONS TO TAKE
I will START or STAY ON the medications listed below when I get home from the hospital:    acetaminophen 325 mg oral tablet  -- 3 tab(s) by mouth every 8 hours, As Needed  -- Indication: For pain    ibuprofen 600 mg oral tablet  -- 1 tab(s) by mouth every 6 hours, As Needed  -- Indication: For pain   I will START or STAY ON the medications listed below when I get home from the hospital:    Electronic Blood Pressure Monitor  -- Please check blood pressures three times a day.  Notify MD if blood pressures are greater than 140/90.  Return to hospital for blood pressures greater than or equal to 160/110.   observe for Preclampsia signs:Headaches, visual changes, light headiness, dizziness, difficulty breating, shortness of breath, epigastric pain, Right upper side/ flank  pain, nausea and or vomitting.  -- Indication: For gestational hypertension    acetaminophen 325 mg oral tablet  -- 3 tab(s) by mouth every 8 hours, As Needed  -- Indication: For pain    ibuprofen 600 mg oral tablet  -- 1 tab(s) by mouth every 6 hours, As Needed  -- Indication: For pain

## 2022-12-03 NOTE — DISCHARGE NOTE OB - HOSPITAL COURSE
Patient admitted for IOL at term for morbid obesity. Patient underwent emergency  delivery for abnormal fetal status in labor. Patient had uncomplicated postpartum course and was discharged home on postoperative day 3 in stable condition.

## 2022-12-03 NOTE — LACTATION INITIAL EVALUATION - LACTATION INTERVENTIONS
Primary RN made aware of consult and plan./initiate/review safe skin-to-skin/initiate/review hand expression/initiate/review techniques for position and latch/post discharge community resources provided/review techniques to manage sore nipples/engorgement/initiate/review breast massage/compression/reviewed components of an effective feeding and at least 8 effective feedings per day required/reviewed importance of monitoring infant diapers, the breastfeeding log, and minimum output each day/reviewed risks of unnecessary formula supplementation/reviewed risks of artificial nipples/reviewed strategies to transition to breastfeeding only/reviewed benefits and recommendations for rooming in/reviewed feeding on demand/by cue at least 8 times a day

## 2022-12-03 NOTE — LACTATION INITIAL EVALUATION - NS_FINALEDD_OBGYN_ALL_OB_DT
Called Radiology and spoke with Delonte and their policy is to have patient be off med for 5 days prior.  Patient is scheduled for breast biopsy next Thursday.    Called patient and left her a message of how many days you stated she should hold the fish oil.  Left in message to call office if she has any questions.   26-Nov-2022

## 2022-12-03 NOTE — DISCHARGE NOTE OB - PATIENT PORTAL LINK FT
You can access the FollowMyHealth Patient Portal offered by [facility] by registering at the following website: [email or web address]. By joining [employer]’s FollowMyHealth portal, you will also be able to view your health information using other applications (apps) compatible with our system.

## 2022-12-03 NOTE — DISCHARGE NOTE OB - CLICK TO LAUNCH ORM
Fax sent.   
How about we use Norco 5/3251 p.o. q.h.s. to get her some pain relief so she can sleep.  She can use tramadol during the day for her pain and discomfort.  
Patient was seen in ED last night for humeral head fx, she was given tramadol and norco however caregiver and family declined norco saying that it makes her too drowsy.  Ernesto from group home calling stating that patient did not sleep last night as she is in so much pain, he would like something stronger for pain, he would like Dr. Espino to decide if norco is appropriate or if she should have something else.  Please advise.    Sommer Allen    Please call Ernesto, [phone]  
Spoke to Ernesto in regards to medications.  He would like a fax sent to [phone] with instructions and a print out of the RX for Norco.      Also, he is wondering about acetaminophen dosage for patient and how much she should be taking during the day.  ER recommended acetaminophen (TYLENOL) 500 MG tablet 2 tablets every 6 hours as needed for pain.    Ernesto states the facility has acetaminophen (TYLENOL) 325 MG tablet.  Spoke to Dr. Espino patient can take 2 tablets 4 times daily as needed for pain.      
.

## 2022-12-03 NOTE — DISCHARGE NOTE OB - CARE PLAN
1 Principal Discharge DX:	Delivery by emergency   Assessment and plan of treatment:	patient is s/p emergency  delivery for abnormal fetal status during induction of labor. Recovering well  Plan for regular diet, activities as tolerated, nothing per vagina, follow up with obstetrician for postop visit in 10-14 days

## 2022-12-03 NOTE — DISCHARGE NOTE OB - ADDITIONAL INSTRUCTIONS
Check blood pressures three times a day (gestational hypertension).  Keep log to bring in with visit.  Call office for blood pressures greater than 140s/90s.  Return to office for blood pressures more than 160/110 or experiencing headaches unrelieved with medications, blurry vision, right upper quadrant pain, or epigastric pain.  Follow up with MD in 1 week for BP check.   Incisional check 1 to 2 weeks  Postpartum visit in 6 weeks

## 2022-12-03 NOTE — DISCHARGE NOTE OB - PLAN OF CARE
patient is s/p emergency  delivery for abnormal fetal status during induction of labor. Recovering well  Plan for regular diet, activities as tolerated, nothing per vagina, follow up with obstetrician for postop visit in 10-14 days

## 2022-12-03 NOTE — DISCHARGE NOTE OB - NS MD DC FALL RISK RISK
For information on Fall & Injury Prevention, visit: [email or web address] OR  [email or web address] OR  https://www.cdc.gov/steadi/patient.html

## 2022-12-04 VITALS
RESPIRATION RATE: 17 BRPM | OXYGEN SATURATION: 100 % | SYSTOLIC BLOOD PRESSURE: 129 MMHG | TEMPERATURE: 98 F | DIASTOLIC BLOOD PRESSURE: 57 MMHG | HEART RATE: 94 BPM

## 2022-12-04 RX ADMIN — Medication 600 MILLIGRAM(S): at 00:40

## 2022-12-04 RX ADMIN — Medication 600 MILLIGRAM(S): at 12:37

## 2022-12-04 RX ADMIN — Medication 600 MILLIGRAM(S): at 00:06

## 2022-12-04 RX ADMIN — Medication 975 MILLIGRAM(S): at 02:30

## 2022-12-04 RX ADMIN — Medication 975 MILLIGRAM(S): at 03:00

## 2022-12-04 RX ADMIN — Medication 600 MILLIGRAM(S): at 13:15

## 2022-12-04 RX ADMIN — Medication 975 MILLIGRAM(S): at 14:04

## 2022-12-04 RX ADMIN — HEPARIN SODIUM 10000 UNIT(S): 5000 INJECTION INTRAVENOUS; SUBCUTANEOUS at 05:56

## 2022-12-04 RX ADMIN — Medication 600 MILLIGRAM(S): at 05:56

## 2022-12-04 NOTE — PROGRESS NOTE ADULT - SUBJECTIVE AND OBJECTIVE BOX
Attending note  Patient was seen and evauated at bedside.  S: 32y POD#2  Patient doing well. Minimal to moderate lochia. Pain controlled. Voiding. Passing Flatus. Tolerating a regular diet.   denies SOB, chest pain or palpitations. Patient denies h/a, changes in vision, RUQ pain  O: Vital Signs Last 24 Hrs  T(C): 36.6 (03 Dec 2022 06:00), Max: 37 (02 Dec 2022 17:44)  T(F): 97.8 (03 Dec 2022 06:00), Max: 98.6 (02 Dec 2022 17:44)  HR: 93 (03 Dec 2022 06:00) (90 - 102)  BP: 125/71 (03 Dec 2022 06:00) (106/69 - 129/66)  RR: 16 (03 Dec 2022 06:00) (16 - 18)  SpO2: 100% (03 Dec 2022 06:00) (100% - 100%)    Parameters below as of 02 Dec 2022 14:00  Patient On (Oxygen Delivery Method): room air        Gen: NAD A+Ox3  Abd: soft, NT, ND, fundus firm below umbilicus  Incision: clean, dry, intact  Lochia: minimal to moderate  Ext: no tenderness b/l    Meds:  acetaminophen     Tablet .. 975 milliGRAM(s) Oral <User Schedule>  dexAMETHasone  Injectable 4 milliGRAM(s) IV Push every 6 hours PRN  diphenhydrAMINE 25 milliGRAM(s) Oral every 4 hours PRN  diphenhydrAMINE 25 milliGRAM(s) Oral every 6 hours PRN  diphtheria/tetanus/pertussis (acellular) Vaccine (Adacel) 0.5 milliLiter(s) IntraMuscular once  ferrous    sulfate 325 milliGRAM(s) Oral daily  folic acid 1 milliGRAM(s) Oral daily  heparin   Injectable 63664 Unit(s) SubCutaneous every 12 hours  ibuprofen  Tablet. 600 milliGRAM(s) Oral every 6 hours  lactated ringers. 1000 milliLiter(s) IV Continuous <Continuous>  lanolin Ointment 1 Application(s) Topical every 6 hours PRN  magnesium hydroxide Suspension 30 milliLiter(s) Oral two times a day PRN  morphine PF Epidural 2 milliGRAM(s) Epidural once  naloxone Injectable 0.1 milliGRAM(s) IV Push every 3 minutes PRN  ondansetron Injectable 4 milliGRAM(s) IV Push every 6 hours PRN  oxyCODONE    IR 5 milliGRAM(s) Oral every 3 hours PRN  oxyCODONE    IR 5 milliGRAM(s) Oral once PRN  oxytocin Infusion 333.333 milliUNIT(s)/Min IV Continuous <Continuous>  oxytocin Infusion 333.333 milliUNIT(s)/Min IV Continuous <Continuous>  prenatal multivitamin 1 Tablet(s) Oral daily  simethicone 80 milliGRAM(s) Chew every 4 hours PRN    Labs:                        11.3   13.69 )-----------( 241      ( 02 Dec 2022 06:15 )             35.4       A: 32y POD#2 s/p emergency CD. Doing well.    Plan: Increase ambulation. Advance diet as tolerated. Tylenol and Motrin q6 hrs. Oxycodone prn.   DVT prophylaxis. Routine postop care. s/p Ertapenem 24 hours. Afebrile, no s/s of infection   Incision is healing well. Pain well controlled. Emptying bladder and no pain/burning with urination.  plan to repeat CBC in am and plan for discharge home tomorrow. Follow up for postoperative visit in 10-14 days.  MD alejandra
S: 31yo POD#3 s/p pLTCS. c/b gHTN. The patient feels well.  Pain is well controlled. She is tolerating a regular diet and passing flatus. She is voiding spontaneously, and ambulating without difficulty. Denies: Headache, visual changes, RUQ pain  CP/SOB lightheadedness/dizziness. Denies N/V.    O:  Vitals:  Vital Signs Last 24 Hrs  T(C): 36.8 (04 Dec 2022 06:35), Max: 37 (03 Dec 2022 19:00)  T(F): 98.2 (04 Dec 2022 06:35), Max: 98.6 (03 Dec 2022 19:00)  HR: 90 (04 Dec 2022 06:35) (84 - 102)  BP: 108/67 (04 Dec 2022 06:35) (108/67 - 139/77)  BP(mean): --  RR: 18 (04 Dec 2022 06:35) (17 - 18)  SpO2: 99% (04 Dec 2022 06:35) (98% - 100%)    Parameters below as of 03 Dec 2022 14:02  Patient On (Oxygen Delivery Method): room air        MEDICATIONS  (STANDING):  acetaminophen     Tablet .. 975 milliGRAM(s) Oral <User Schedule>  diphtheria/tetanus/pertussis (acellular) Vaccine (Adacel) 0.5 milliLiter(s) IntraMuscular once  ferrous    sulfate 325 milliGRAM(s) Oral daily  folic acid 1 milliGRAM(s) Oral daily  heparin   Injectable 44733 Unit(s) SubCutaneous every 12 hours  ibuprofen  Tablet. 600 milliGRAM(s) Oral every 6 hours  prenatal multivitamin 1 Tablet(s) Oral daily    MEDICATIONS  (PRN):  dexAMETHasone  Injectable 4 milliGRAM(s) IV Push every 6 hours PRN Nausea  diphenhydrAMINE 25 milliGRAM(s) Oral every 4 hours PRN Pruritus  diphenhydrAMINE 25 milliGRAM(s) Oral every 6 hours PRN Pruritus  lanolin Ointment 1 Application(s) Topical every 6 hours PRN Sore Nipples  magnesium hydroxide Suspension 30 milliLiter(s) Oral two times a day PRN Constipation  naloxone Injectable 0.1 milliGRAM(s) IV Push every 3 minutes PRN For ANY of the following changes in patient status:  A. Breaths Per Minute LESS THAN 10, B. Oxygen saturation LESS THAN 90%, C. Sedation score of 6 for Stop After: 4 Times  ondansetron Injectable 4 milliGRAM(s) IV Push every 6 hours PRN Nausea  oxyCODONE    IR 5 milliGRAM(s) Oral every 3 hours PRN Moderate to Severe Pain (4-10)  oxyCODONE    IR 5 milliGRAM(s) Oral once PRN Moderate to Severe Pain (4-10)  simethicone 80 milliGRAM(s) Chew every 4 hours PRN Gas      LABS:  Blood type: B Negative  Rubella IgG: RPR: Negative                          11.3<L>   13.69<H> >-----------< 241    ( 12-02 @ 06:15 )             35.4                        13.1   16.87<H> >-----------< 250    ( 12-01 @ 20:45 )             41.4    12-02-22 @ 06:15      131<L>  |  102  |  6<L>  ----------------------------<  116<H>  3.6   |  21<L>  |  0.57    12-01-22 @ 20:45      134<L>  |  103  |  4<L>  ----------------------------<  140<H>  3.8   |  18<L>  |  0.56        Ca    8.4      02 Dec 2022 06:15  Ca    8.8      01 Dec 2022 20:45    TPro  5.2<L>  /  Alb  2.5<L>  /  TBili  0.9  /  DBili  x   /  AST  29  /  ALT  30  /  AlkPhos  112  12-02-22 @ 06:15  TPro  6.1  /  Alb  2.9<L>  /  TBili  0.8  /  DBili  x   /  AST  29  /  ALT  34<H>  /  AlkPhos  137<H>  12-01-22 @ 20:45          Physical exam:  Gen: NAD, Alert, OX4  Abdomen: Soft, nontender, no distension , FF@U  Incision:Steri-srtip Clean, dry, and intact   Pelvic: Normal lochia noted  Ext: No calf tenderness, no edema, o erythema    A/P: 31yo POD#3 s/p pLTCS.  Patient is stable and is doing well post-operatively.  - Continue Motrin, Tylenol, Oxycodone PRN for pain control.  - Increase ambulation/SCD while in bed  - Continue regular diet/Hydration  -incisional care reviewed  -f/u in 6 weeks w/ OB office for PP check up    gHTN  - Script sent to [facility] pharmacy in [facility]  -Instructed to take BP t.i.d & record  -PEC S&S reviewed w/ pt: HA/SOB/Dyspnea, Visual changes, RUQ pain, CP/Epigastric Pain, N&V.  -PEC parameter explained. 140/90 call OB. 160/110 return to Hospital    - Discharge planning    Danita Cerda NP        
Attending note  Patient was seen and evauated at bedside.  S: 32y POD#  Patient doing well. Minimal to moderate lochia. Pain controlled. Voiding. Passing Flatus. Tolerating a regular diet.     O: Vital Signs Last 24 Hrs  T(C): 36.8 (04 Dec 2022 06:35), Max: 37 (03 Dec 2022 19:00)  T(F): 98.2 (04 Dec 2022 06:35), Max: 98.6 (03 Dec 2022 19:00)  HR: 90 (04 Dec 2022 06:35) (84 - 102)  BP: 108/67 (04 Dec 2022 06:35) (108/67 - 139/77)  BP(mean): --  RR: 18 (04 Dec 2022 06:35) (17 - 18)  SpO2: 99% (04 Dec 2022 06:35) (98% - 100%)    Parameters below as of 03 Dec 2022 14:02  Patient On (Oxygen Delivery Method): room air        Gen: NAD A+Ox3  Abd: soft, NT, ND, fundus firm below umbilicus  Incision: clean, dry, intact  Lochia: minimal to moderate  Ext: no tenderness b/l, edema uniform and bilateral +1, neg Asaf's    Meds:  acetaminophen     Tablet .. 975 milliGRAM(s) Oral <User Schedule>  dexAMETHasone  Injectable 4 milliGRAM(s) IV Push every 6 hours PRN  diphenhydrAMINE 25 milliGRAM(s) Oral every 4 hours PRN  diphenhydrAMINE 25 milliGRAM(s) Oral every 6 hours PRN  diphtheria/tetanus/pertussis (acellular) Vaccine (Adacel) 0.5 milliLiter(s) IntraMuscular once  ferrous    sulfate 325 milliGRAM(s) Oral daily  folic acid 1 milliGRAM(s) Oral daily  heparin   Injectable 99844 Unit(s) SubCutaneous every 12 hours  ibuprofen  Tablet. 600 milliGRAM(s) Oral every 6 hours  lanolin Ointment 1 Application(s) Topical every 6 hours PRN  magnesium hydroxide Suspension 30 milliLiter(s) Oral two times a day PRN  naloxone Injectable 0.1 milliGRAM(s) IV Push every 3 minutes PRN  ondansetron Injectable 4 milliGRAM(s) IV Push every 6 hours PRN  oxyCODONE    IR 5 milliGRAM(s) Oral every 3 hours PRN  oxyCODONE    IR 5 milliGRAM(s) Oral once PRN  prenatal multivitamin 1 Tablet(s) Oral daily  simethicone 80 milliGRAM(s) Chew every 4 hours PRN    Labs:      A: 32y POD#3 s/p CD. Doing well.    Plan: Increase ambulation. Advance diet as tolerated. Tylenol and Motrin q6 hrs. Oxycodone prn.   DVT prophylaxis. Routine postop care. Afebrile. Discharge patient home. Patient to follow up with ob in 2 weeks for postop visit.   MD alejandra  
OB Progress Note:  Delivery, POD#1    S: 32y POD#1 s/p emergent pLTCS for cat2 tracing c/b gHTN. Pain controlled. Tolerating a regular diet Denies n/v, chest pain, shortness of breath, or lightheadedness/dizziness. Not yet OOB. Not yet voided since garcia removal ~7a. Denies any fevers, chills, headaches, or scotomas    O:   Vital Signs Last 24 Hrs  T(C): 36.9 (02 Dec 2022 06:57), Max: 37 (02 Dec 2022 01:10)  T(F): 98.5 (02 Dec 2022 06:57), Max: 98.6 (02 Dec 2022 01:10)  HR: 99 (02 Dec 2022 06:57) (76 - 110)  BP: 119/67 (02 Dec 2022 06:57) (100/65 - 183/91)  BP(mean): 90 (02 Dec 2022 00:15) (73 - 116)  RR: 18 (02 Dec 2022 06:57) (3 - 25)  SpO2: 100% (02 Dec 2022 06:57) (83% - 100%)    Parameters below as of 02 Dec 2022 06:57  Patient On (Oxygen Delivery Method): room air        Labs:  Blood type: B Negative  Rubella IgG: RPR: Negative                          11.3<L>   13.69<H> >-----------< 241    (  @ 06:15 )             35.4                        13.1   16.87<H> >-----------< 250    (  @ 20:45 )             41.4                        12.5   7.44 >-----------< 261    (  @ 18:20 )             38.3    22 @ 06:15      131<L>  |  102  |  6<L>  ----------------------------<  116<H>  3.6   |  21<L>  |  0.57    22 @ 20:45      134<L>  |  103  |  4<L>  ----------------------------<  140<H>  3.8   |  18<L>  |  0.56        Ca    8.4      02 Dec 2022 06:15  Ca    8.8      01 Dec 2022 20:45    TPro  5.2<L>  /  Alb  2.5<L>  /  TBili  0.9  /  DBili  x   /  AST  29  /  ALT  30  /  AlkPhos  112  22 @ 06:15  TPro  6.1  /  Alb  2.9<L>  /  TBili  0.8  /  DBili  x   /  AST  29  /  ALT  34<H>  /  AlkPhos  137<H>  22 @ 20:45          PE:  General: No acute distress. Resting in bed comfortably prior to eval  Pulm: Unlabored breathing. No respiratory distress  Abdomen: Soft. Non-tender. Incision c/d/i. Abd pad placed in abdominal fold for moisture management   Extremities: No calf tenderness bilaterally

## 2022-12-09 ENCOUNTER — APPOINTMENT (OUTPATIENT)
Dept: ANTEPARTUM | Facility: CLINIC | Age: 32
End: 2022-12-09

## 2023-07-28 NOTE — DISCHARGE NOTE OB - PROVIDER TOKENS
Last Appt for Medication Check: 3/27/2023    Future Appt scheduled: NO    Last Labs: 8/2/2022    Last Refill of Medication: 2/20/2023    Refilled per protocol - PASSED  phentermine (ADIPEX-P) 37.5 MG tablet 30 tablet 0 2/20/2023     Sig: TAKE ONE TABLET BY MOUTH DAILY         PROVIDER:[TOKEN:[[id number]]]

## 2024-04-22 NOTE — OB RN INTRAOPERATIVE NOTE - NS_DISCHARGEDTO_OBGYN_ALL_OB
No care due was identified.  [facility] Embedded Care Due Messages. Reference number: [id number]. 4/22/2024 5:51:51 AM ANITA   L&D Pacu

## 2025-06-13 ENCOUNTER — APPOINTMENT (OUTPATIENT)
Dept: OBGYN | Facility: CLINIC | Age: 35
End: 2025-06-13

## 2025-06-30 ENCOUNTER — ASOB RESULT (OUTPATIENT)
Age: 35
End: 2025-06-30

## 2025-06-30 ENCOUNTER — APPOINTMENT (OUTPATIENT)
Dept: ANTEPARTUM | Facility: CLINIC | Age: 35
End: 2025-06-30
Payer: COMMERCIAL

## 2025-06-30 PROCEDURE — 76801 OB US < 14 WKS SINGLE FETUS: CPT

## 2025-06-30 PROCEDURE — 76813 OB US NUCHAL MEAS 1 GEST: CPT

## 2025-06-30 PROCEDURE — 76817 TRANSVAGINAL US OBSTETRIC: CPT

## 2025-08-28 ENCOUNTER — ASOB RESULT (OUTPATIENT)
Age: 35
End: 2025-08-28

## 2025-08-28 ENCOUNTER — APPOINTMENT (OUTPATIENT)
Dept: ANTEPARTUM | Facility: CLINIC | Age: 35
End: 2025-08-28

## 2025-08-28 PROCEDURE — 76811 OB US DETAILED SNGL FETUS: CPT

## 2025-09-19 ENCOUNTER — APPOINTMENT (OUTPATIENT)
Dept: ANTEPARTUM | Facility: CLINIC | Age: 35
End: 2025-09-19

## 2025-09-19 ENCOUNTER — ASOB RESULT (OUTPATIENT)
Age: 35
End: 2025-09-19